# Patient Record
(demographics unavailable — no encounter records)

---

## 2025-07-25 NOTE — HISTORY OF PRESENT ILLNESS
[FreeTextEntry1] : 47yo P1 LMP 2021 at time of hysterectomy,  referred for transfer of care, last seen by GYN Onc at [facility] 1/2025, also follows with Dr Hough. 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 She has hx of clotting disorder and stroke, on Xarelto. Follows with new neurologist, unsure of his name. 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 denies n/v/fever/bleeding/bloating. Reports normal urination. Pt has questions about menopause related mood sxs and asks about HRT. I explained that give her high risk ER/WA+ endometrial cancer she is not a candidate for HRT however she can talk to her psychiatrist about alternative medications for mood mgmt. I also mentioned role of psilocybin for anxiety/ depression however she may need to pursue this through a clinical trial.   Dx: stage 1b FIGO grade 3 endometrioid endometrial ca, ER/WA+, MI 54%, no LVSI, [id number], MMRp, ambry neg Sx: robo tlh/bso, sln bx, vandana 2/25/21 Adjtx: ERT completed 5/2021   PMHx: stroke, DVT, HLD, anxiety/depression, mood disorder PSHx: robo tlh/bso/slnbx 2021, c/section x1, cataracts, retinal detachment OBGYNHx: c/s x1, denies hx of fibroids/cysts/abn paps/stis FamHx: maternal grandmother with thyroid cancer, denies gyn ca, breast ca, colon ca SocHx: former smoker, 1/2 pack/day x15yrs, quit 2016, denies etoh/illicit drugs All: Bactrim, Cipro, PCN, Clindamycin - pt unsure of reactions but states itching/rash and GI upset   mammogram: up to date and wnl colonoscopy: due, last was wnl

## 2025-07-25 NOTE — PHYSICAL EXAM
[MA] : MA [Fully active, able to carry on all pre-disease performance without restriction] : Status 0 - Fully active, able to carry on all pre-disease performance without restriction [FreeTextEntry2] : Aung

## 2025-07-25 NOTE — DISCUSSION/SUMMARY
[Reviewed Clinical Lab Test(s)] : Results of clinical tests were reviewed. [Reviewed Radiology Report(s)] : Radiology reports were reviewed. [Discuss Alternatives/Risks/Benefits w/Patient] : All alternatives, risks, and benefits were discussed with the patient/family and all questions were answered.  Patient expressed good understanding and appreciates the importance of follow up as recommended. [Visit Time ___ Minutes] : [unfilled] minutes [Face to Face Time___ Minutes] : with [unfilled] minutes in face to face consultation. [FreeTextEntry1] : 45yo w/ stage 1b FIGO grade 3 endometrioid endometrial ca, ER/WA+, MI 54%, no LVSI, [id number], MMRp, ambry neg. s/p definitive surgery and adjuvant EBRT completed 2021. Establishing care for continued surveillance.  Pt with baseline mental health conditions and follows with psych. Increased mood changes due to surgical menopause. Not candidate for HRT.  -more than 50% of visit spent face to face with patient reviewing records and interpreting imaging/path/lab results, counseling and coordinating care with high level complexity -I reviewed diagnosis, stage of disease, treatment course. reviewed surveillance and importance of continued annual visits. reviewed si/sxs of recurrence. reviewed role of scans only if new reported sxs or new exam findings -reviewed mood changes and recommended pt f/u with psych to update med mgmt since she cannot receive HRT -rtc 1yr for continued surveillance -continue health maintenance with PCP -pain/fever/bleeding precautions given

## 2025-07-25 NOTE — HISTORY OF PRESENT ILLNESS
[FreeTextEntry1] : 45yo P1 LMP 2021 at time of hysterectomy,  referred for transfer of care, last seen by GYN Onc at [facility] 1/2025, also follows with Dr Hough. 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 She has hx of clotting disorder and stroke, on Xarelto. Follows with new neurologist, unsure of his name. 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 denies n/v/fever/bleeding/bloating. Reports normal urination. Pt has questions about menopause related mood sxs and asks about HRT. I explained that give her high risk ER/LA+ endometrial cancer she is not a candidate for HRT however she can talk to her psychiatrist about alternative medications for mood mgmt. I also mentioned role of psilocybin for anxiety/ depression however she may need to pursue this through a clinical trial.   Dx: stage 1b FIGO grade 3 endometrioid endometrial ca, ER/LA+, MI 54%, no LVSI, [id number], MMRp, ambry neg Sx: robo tlh/bso, sln bx, vandana 2/25/21 Adjtx: ERT completed 5/2021   PMHx: stroke, DVT, HLD, anxiety/depression, mood disorder PSHx: robo tlh/bso/slnbx 2021, c/section x1, cataracts, retinal detachment OBGYNHx: c/s x1, denies hx of fibroids/cysts/abn paps/stis FamHx: maternal grandmother with thyroid cancer, denies gyn ca, breast ca, colon ca SocHx: former smoker, 1/2 pack/day x15yrs, quit 2016, denies etoh/illicit drugs All: Bactrim, Cipro, PCN, Clindamycin - pt unsure of reactions but states itching/rash and GI upset   mammogram: up to date and wnl colonoscopy: due, last was wnl

## 2025-07-25 NOTE — DISCUSSION/SUMMARY
[Reviewed Clinical Lab Test(s)] : Results of clinical tests were reviewed. [Reviewed Radiology Report(s)] : Radiology reports were reviewed. [Discuss Alternatives/Risks/Benefits w/Patient] : All alternatives, risks, and benefits were discussed with the patient/family and all questions were answered.  Patient expressed good understanding and appreciates the importance of follow up as recommended. [Visit Time ___ Minutes] : [unfilled] minutes [Face to Face Time___ Minutes] : with [unfilled] minutes in face to face consultation. [FreeTextEntry1] : 45yo w/ stage 1b FIGO grade 3 endometrioid endometrial ca, ER/MA+, MI 54%, no LVSI, [id number], MMRp, ambry neg. s/p definitive surgery and adjuvant EBRT completed 2021. Establishing care for continued surveillance.  Pt with baseline mental health conditions and follows with psych. Increased mood changes due to surgical menopause. Not candidate for HRT.  -more than 50% of visit spent face to face with patient reviewing records and interpreting imaging/path/lab results, counseling and coordinating care with high level complexity -I reviewed diagnosis, stage of disease, treatment course. reviewed surveillance and importance of continued annual visits. reviewed si/sxs of recurrence. reviewed role of scans only if new reported sxs or new exam findings -reviewed mood changes and recommended pt f/u with psych to update med mgmt since she cannot receive HRT -rtc 1yr for continued surveillance -continue health maintenance with PCP -pain/fever/bleeding precautions given